# Patient Record
Sex: FEMALE | Race: WHITE | NOT HISPANIC OR LATINO | Employment: FULL TIME | URBAN - METROPOLITAN AREA
[De-identification: names, ages, dates, MRNs, and addresses within clinical notes are randomized per-mention and may not be internally consistent; named-entity substitution may affect disease eponyms.]

---

## 2023-10-22 ENCOUNTER — HOSPITAL ENCOUNTER (OUTPATIENT)
Dept: RADIOLOGY | Facility: HOSPITAL | Age: 43
Discharge: HOME | End: 2023-10-22

## 2023-10-22 DIAGNOSIS — Z82.49 FAMILY HISTORY OF ISCHEMIC HEART DISEASE AND OTHER DISEASES OF THE CIRCULATORY SYSTEM: ICD-10-CM

## 2023-10-22 PROCEDURE — 75571 CT HRT W/O DYE W/CA TEST: CPT

## 2023-11-16 ENCOUNTER — OFFICE VISIT (OUTPATIENT)
Dept: PRIMARY CARE | Facility: CLINIC | Age: 43
End: 2023-11-16
Payer: COMMERCIAL

## 2023-11-16 VITALS
HEIGHT: 62 IN | TEMPERATURE: 98.5 F | WEIGHT: 163 LBS | HEART RATE: 65 BPM | SYSTOLIC BLOOD PRESSURE: 106 MMHG | DIASTOLIC BLOOD PRESSURE: 64 MMHG | RESPIRATION RATE: 14 BRPM | BODY MASS INDEX: 30 KG/M2

## 2023-11-16 DIAGNOSIS — R91.1 LUNG NODULE: Primary | ICD-10-CM

## 2023-11-16 DIAGNOSIS — C44.91 PIGMENTED BASAL CELL CARCINOMA: ICD-10-CM

## 2023-11-16 PROCEDURE — 1036F TOBACCO NON-USER: CPT | Performed by: NURSE PRACTITIONER

## 2023-11-16 PROCEDURE — 99212 OFFICE O/P EST SF 10 MIN: CPT | Performed by: NURSE PRACTITIONER

## 2023-11-16 PROCEDURE — 99212 OFFICE O/P EST SF 10 MIN: CPT | Mod: ZK | Performed by: NURSE PRACTITIONER

## 2023-11-16 SDOH — ECONOMIC STABILITY: FOOD INSECURITY: WITHIN THE PAST 12 MONTHS, YOU WORRIED THAT YOUR FOOD WOULD RUN OUT BEFORE YOU GOT MONEY TO BUY MORE.: NEVER TRUE

## 2023-11-16 SDOH — ECONOMIC STABILITY: FOOD INSECURITY: WITHIN THE PAST 12 MONTHS, THE FOOD YOU BOUGHT JUST DIDN'T LAST AND YOU DIDN'T HAVE MONEY TO GET MORE.: NEVER TRUE

## 2023-11-16 ASSESSMENT — ENCOUNTER SYMPTOMS
LOSS OF SENSATION IN FEET: 0
DEPRESSION: 0
OCCASIONAL FEELINGS OF UNSTEADINESS: 0

## 2023-11-16 ASSESSMENT — COLUMBIA-SUICIDE SEVERITY RATING SCALE - C-SSRS
1. IN THE PAST MONTH, HAVE YOU WISHED YOU WERE DEAD OR WISHED YOU COULD GO TO SLEEP AND NOT WAKE UP?: NO
2. HAVE YOU ACTUALLY HAD ANY THOUGHTS OF KILLING YOURSELF?: NO

## 2023-11-16 ASSESSMENT — PATIENT HEALTH QUESTIONNAIRE - PHQ9
SUM OF ALL RESPONSES TO PHQ9 QUESTIONS 1 AND 2: 0
1. LITTLE INTEREST OR PLEASURE IN DOING THINGS: NOT AT ALL
2. FEELING DOWN, DEPRESSED OR HOPELESS: NOT AT ALL

## 2023-11-16 NOTE — PATIENT INSTRUCTIONS
New patient - new patient information packet given.   3 mm left lower lobe nodule.    Recommend CT Chest   Patient will be notified of results as they become available.     Hiatal hernia to be followed by PCP,

## 2023-11-16 NOTE — PROGRESS NOTES
"Subjective   Patient ID: Katerina Hicks is a 42 y.o. female who presents for Lung Nodule.  HPI42 y.o. female presents today for lung nodule clinic  Never smoker   Second hand smoke exposure.   No family history of cancer.  Moes procedure - Dermatology following q 6 months.   Pigmented basal  cell carcinoma per patient.   No records for review.  [facility]     Ct Cardiac dated 10/22/2023  3 mm nodule observed in the left lower lobe     Review of Systems  Review of systems: Present-feeling well. Not present-chills, fatigue and fever.  Respiratory: Not present-difficulty breathing, cough, bloody sputum.  Cardiovascular: Not present-chest pain, palpitations, dyspnea on exertion.  Objective   /64   Pulse 65   Temp 36.9 °C (98.5 °F)   Resp 14   Ht 1.575 m (5' 2\")   Wt 73.9 kg (163 lb)   BMI 29.81 kg/m²      Physical Exam  Gen.: Mental status-alert. Gen. appearance-cooperative, well groomed and consistent with stated age. Not in acute distress or sickly. Orientation-oriented to time, place, purpose and person. Build and nutrition-well-nourished and well-developed. Hydration-well-hydrated.    Chest and lung exam: Auscultation-normal breath sounds, no adventitious lung sounds and normal vocal resonance. Chest wall is normal in shape and non-tender.    Cardiovascular: Auscultation: Regular rate and rhythm. Heart sounds-normal heart sounds, S1-S2. No murmurs or gallops appreciated. Carotid arteries normal and without bruit.  Assessment/Plan   Diagnoses and all orders for this visit:  Lung nodule  -     CT chest wo IV contrast; Future        "

## 2023-12-01 ENCOUNTER — ANCILLARY PROCEDURE (OUTPATIENT)
Dept: RADIOLOGY | Facility: CLINIC | Age: 43
End: 2023-12-01
Payer: COMMERCIAL

## 2023-12-01 DIAGNOSIS — R91.1 LUNG NODULE: ICD-10-CM

## 2023-12-01 DIAGNOSIS — C44.91 PIGMENTED BASAL CELL CARCINOMA: ICD-10-CM

## 2023-12-01 PROBLEM — J02.0 STREP PHARYNGITIS: Status: ACTIVE | Noted: 2023-12-01

## 2023-12-01 PROBLEM — H66.90 OTITIS MEDIA: Status: ACTIVE | Noted: 2023-12-01

## 2023-12-01 PROBLEM — J01.90 ACUTE SINUSITIS: Status: ACTIVE | Noted: 2023-12-01

## 2023-12-01 PROBLEM — S13.4XXA WHIPLASH: Status: ACTIVE | Noted: 2023-12-01

## 2023-12-01 PROCEDURE — 71250 CT THORAX DX C-: CPT

## 2023-12-01 PROCEDURE — 71250 CT THORAX DX C-: CPT | Performed by: RADIOLOGY

## 2023-12-07 ENCOUNTER — OFFICE VISIT (OUTPATIENT)
Dept: PRIMARY CARE | Facility: CLINIC | Age: 43
End: 2023-12-07
Payer: COMMERCIAL

## 2023-12-07 VITALS
BODY MASS INDEX: 30.29 KG/M2 | SYSTOLIC BLOOD PRESSURE: 106 MMHG | RESPIRATION RATE: 16 BRPM | WEIGHT: 164.6 LBS | DIASTOLIC BLOOD PRESSURE: 67 MMHG | OXYGEN SATURATION: 98 % | TEMPERATURE: 98.5 F | HEART RATE: 61 BPM | HEIGHT: 62 IN

## 2023-12-07 DIAGNOSIS — R91.8 MULTIPLE LUNG NODULES ON CT: Primary | ICD-10-CM

## 2023-12-07 DIAGNOSIS — Z85.828 HISTORY OF BASAL CELL CARCINOMA: ICD-10-CM

## 2023-12-07 DIAGNOSIS — E04.2 MULTINODULAR THYROID: ICD-10-CM

## 2023-12-07 DIAGNOSIS — R59.1 LYMPHADENOPATHY: ICD-10-CM

## 2023-12-07 PROCEDURE — 99213 OFFICE O/P EST LOW 20 MIN: CPT | Performed by: NURSE PRACTITIONER

## 2023-12-07 PROCEDURE — 1036F TOBACCO NON-USER: CPT | Performed by: NURSE PRACTITIONER

## 2023-12-07 PROCEDURE — 99212 OFFICE O/P EST SF 10 MIN: CPT | Mod: ZK | Performed by: NURSE PRACTITIONER

## 2023-12-07 ASSESSMENT — PATIENT HEALTH QUESTIONNAIRE - PHQ9
1. LITTLE INTEREST OR PLEASURE IN DOING THINGS: NOT AT ALL
SUM OF ALL RESPONSES TO PHQ9 QUESTIONS 1 AND 2: 0
2. FEELING DOWN, DEPRESSED OR HOPELESS: NOT AT ALL

## 2023-12-07 ASSESSMENT — PAIN SCALES - GENERAL: PAINLEVEL: 0-NO PAIN

## 2023-12-07 ASSESSMENT — COLUMBIA-SUICIDE SEVERITY RATING SCALE - C-SSRS
1. IN THE PAST MONTH, HAVE YOU WISHED YOU WERE DEAD OR WISHED YOU COULD GO TO SLEEP AND NOT WAKE UP?: NO
6. HAVE YOU EVER DONE ANYTHING, STARTED TO DO ANYTHING, OR PREPARED TO DO ANYTHING TO END YOUR LIFE?: NO
2. HAVE YOU ACTUALLY HAD ANY THOUGHTS OF KILLING YOURSELF?: NO

## 2023-12-07 ASSESSMENT — ENCOUNTER SYMPTOMS
LOSS OF SENSATION IN FEET: 0
DEPRESSION: 0
OCCASIONAL FEELINGS OF UNSTEADINESS: 0

## 2023-12-07 NOTE — PROGRESS NOTES
"Subjective   Patient ID: Katerina Hicks is a 43 y.o. female who presents for Follow-up (Katerina Hicks is here for a follow up visit with a CT scan prior./).  HPI 43 y.o. female presents today for lung nodule clinic  Never smoker   Second hand smoke exposure.   No family history of cancer.  Moes procedure - Dermatology following q 6 months.  Pigmented basal  cell carcinoma per patient.   No records for review.  [facility]     CT Chest 12/1/2023  8 mm pleural-based right lung base nodule image 236 not included  previously. Unchanged 3 mm anterior left lower lobe nodule image 156.  Mild atelectasis anteromedially on the right.    Heterogeneous probably multinodular thyroid gland with  hypoattenuating nodules up to least 1.5 cm on the right and with  associated coarse calcification.      Mild soft tissue prominence anterior mediastinum probably relating to  residual thymic tissue. Mildly prominent nonspecific mediastinal  nodes up to 7 mm short axis image 58 and axillary nodes up to 9 mm  short axis.    Ct Cardiac dated 10/22/2023  3 mm nodule observed in the left lower lobe     Review of Systems  Review of systems: Present-feeling well. Not present-chills, fatigue and fever.  Respiratory: Not present-difficulty breathing, cough, bloody sputum.  Cardiovascular: Not present-chest pain, palpitations, dyspnea on exertion.  Objective   /67   Pulse 61   Temp 36.9 °C (98.5 °F)   Resp 16   Ht 1.575 m (5' 2\")   Wt 74.7 kg (164 lb 9.6 oz)   SpO2 98%   BMI 30.11 kg/m²      Physical Exam  Gen.: Mental status-alert. Gen. appearance-cooperative, well groomed and consistent with stated age. Not in acute distress or sickly. Orientation-oriented to time, place, purpose and person. Build and nutrition-well-nourished and well-developed. Hydration-well-hydrated.    Integumentary: General characteristics-overall examination the patient´s skin reveals-no suspicious lesions, no bruises and no evidence of scars. Color-normal " coloration skin. Skin moisture-normal skin moisture.    Chest and lung exam: Auscultation-normal breath sounds, no adventitious lung sounds and normal vocal resonance. Chest wall is normal in shape and non-tender.    Cardiovascular: Auscultation: Regular rate and rhythm. Heart sounds-normal heart sounds, S1-S2. No murmurs or gallops appreciated. Carotid arteries normal and without bruit.  Assessment/Plan   Diagnoses and all orders for this visit:  Multiple lung nodules on CT  -     Basic metabolic panel; Future  -     NM PET CT lung CA initial diagnosis; Future  Multinodular thyroid  -     NM PET CT lung CA initial diagnosis; Future  History of basal cell carcinoma  -     NM PET CT lung CA initial diagnosis; Future  Lymphadenopathy  -     NM PET CT lung CA initial diagnosis; Future

## 2023-12-07 NOTE — PATIENT INSTRUCTIONS
Lung nodule follow-up.  Unchanged 3 mm anterior left lower lobe nodule. However, there is an 8 mm pleural-based right lung base nodule not included previously.  Considering history of basal cell carcinoma, PET-CT ordered.  She will be notified of results as they become available.    Mild soft tissue prominence anterior mediastinum probably relating to residual thymic tissue.Mildly prominent nonspecific mediastinal and axillary nodes.    Incidental Finding:  There are few small hypoattenuating nodules measuring equal to or greater than 1.5 cm in the thyroid gland. Further evaluation with nonemergent thyroid ultrasound.  PCP to follow.

## 2023-12-08 ENCOUNTER — LAB (OUTPATIENT)
Dept: LAB | Facility: LAB | Age: 43
End: 2023-12-08
Payer: COMMERCIAL

## 2023-12-08 DIAGNOSIS — R91.8 MULTIPLE LUNG NODULES ON CT: ICD-10-CM

## 2023-12-08 LAB
ANION GAP SERPL CALC-SCNC: 10 MMOL/L (ref 10–20)
BUN SERPL-MCNC: 14 MG/DL (ref 6–23)
CALCIUM SERPL-MCNC: 8.7 MG/DL (ref 8.6–10.3)
CHLORIDE SERPL-SCNC: 109 MMOL/L (ref 98–107)
CO2 SERPL-SCNC: 26 MMOL/L (ref 21–32)
CREAT SERPL-MCNC: 0.86 MG/DL (ref 0.5–1.05)
GFR SERPL CREATININE-BSD FRML MDRD: 87 ML/MIN/1.73M*2
GLUCOSE SERPL-MCNC: 95 MG/DL (ref 74–99)
POTASSIUM SERPL-SCNC: 4 MMOL/L (ref 3.5–5.3)
SODIUM SERPL-SCNC: 141 MMOL/L (ref 136–145)

## 2023-12-08 PROCEDURE — 36415 COLL VENOUS BLD VENIPUNCTURE: CPT

## 2023-12-08 PROCEDURE — 80048 BASIC METABOLIC PNL TOTAL CA: CPT

## 2023-12-19 ENCOUNTER — TELEPHONE (OUTPATIENT)
Dept: PRIMARY CARE | Facility: CLINIC | Age: 43
End: 2023-12-19
Payer: COMMERCIAL

## 2023-12-19 NOTE — TELEPHONE ENCOUNTER
Pre cert department - Jacquelyn Lawson  [facility] , Patient CT scan was denied by insurance . She would like to  know how provider would like to proceed regarding  this denial.  Jacquelyn can be reached at [phone]

## 2023-12-20 ENCOUNTER — TELEPHONE (OUTPATIENT)
Dept: PRIMARY CARE | Facility: CLINIC | Age: 43
End: 2023-12-20
Payer: COMMERCIAL

## 2023-12-20 NOTE — TELEPHONE ENCOUNTER
PET Scan was denied by insurance per  Jacquelyn Islas from pre-cert. How do you wish to proceed ? Please contact Jacquelyn Statt at [phone].

## 2023-12-28 ENCOUNTER — APPOINTMENT (OUTPATIENT)
Dept: RADIOLOGY | Facility: CLINIC | Age: 43
End: 2023-12-28
Payer: COMMERCIAL

## 2023-12-28 ENCOUNTER — HOSPITAL ENCOUNTER (OUTPATIENT)
Dept: RADIOLOGY | Facility: HOSPITAL | Age: 43
Discharge: HOME | End: 2023-12-28
Payer: COMMERCIAL

## 2023-12-28 ENCOUNTER — TELEPHONE (OUTPATIENT)
Dept: PRIMARY CARE | Facility: CLINIC | Age: 43
End: 2023-12-28
Payer: COMMERCIAL

## 2023-12-28 DIAGNOSIS — R93.89 ABNORMAL FINDINGS ON DIAGNOSTIC IMAGING OF OTHER SPECIFIED BODY STRUCTURES: ICD-10-CM

## 2023-12-28 DIAGNOSIS — E04.2 NONTOXIC MULTINODULAR GOITER: ICD-10-CM

## 2023-12-28 PROCEDURE — 76536 US EXAM OF HEAD AND NECK: CPT

## 2023-12-28 PROCEDURE — 76536 US EXAM OF HEAD AND NECK: CPT | Performed by: RADIOLOGY

## 2023-12-28 NOTE — TELEPHONE ENCOUNTER
Lena,   I spoke with the patient this morning.   Peer to peer was not completed on 12/27/2023 per request.      Ms Hicks reports the PET was re-scheduled for tomorrow, and you can still call an EMERGENT by calling [phone] 8am EST peer to peer this morning.      Please also call the patient to update her, so that she is aware of status.

## 2023-12-29 ENCOUNTER — HOSPITAL ENCOUNTER (OUTPATIENT)
Dept: RADIOLOGY | Facility: HOSPITAL | Age: 43
Discharge: HOME | End: 2023-12-29
Payer: COMMERCIAL

## 2023-12-29 ENCOUNTER — APPOINTMENT (OUTPATIENT)
Dept: RADIOLOGY | Facility: CLINIC | Age: 43
End: 2023-12-29
Payer: COMMERCIAL

## 2023-12-29 DIAGNOSIS — R91.8 MULTIPLE LUNG NODULES ON CT: ICD-10-CM

## 2023-12-29 DIAGNOSIS — R59.1 LYMPHADENOPATHY: ICD-10-CM

## 2023-12-29 DIAGNOSIS — Z85.828 HISTORY OF BASAL CELL CARCINOMA: ICD-10-CM

## 2023-12-29 DIAGNOSIS — E04.2 MULTINODULAR THYROID: ICD-10-CM

## 2023-12-29 LAB — GLUCOSE BLD MANUAL STRIP-MCNC: 107 MG/DL (ref 74–99)

## 2023-12-29 PROCEDURE — 78815 PET IMAGE W/CT SKULL-THIGH: CPT | Mod: PET TUMOR INIT TX STRAT | Performed by: STUDENT IN AN ORGANIZED HEALTH CARE EDUCATION/TRAINING PROGRAM

## 2023-12-29 PROCEDURE — 78815 PET IMAGE W/CT SKULL-THIGH: CPT | Mod: PI

## 2023-12-29 PROCEDURE — 82947 ASSAY GLUCOSE BLOOD QUANT: CPT

## 2023-12-29 PROCEDURE — A9552 F18 FDG: HCPCS | Performed by: NURSE PRACTITIONER

## 2023-12-29 PROCEDURE — 3430000001 HC RX 343 DIAGNOSTIC RADIOPHARMACEUTICALS: Performed by: NURSE PRACTITIONER

## 2023-12-29 RX ORDER — FLUDEOXYGLUCOSE F 18 200 MCI/ML
12.7 INJECTION, SOLUTION INTRAVENOUS
Status: COMPLETED | OUTPATIENT
Start: 2023-12-29 | End: 2023-12-29

## 2023-12-29 RX ORDER — FLUDEOXYGLUCOSE F 18 200 MCI/ML
12.7 INJECTION, SOLUTION INTRAVENOUS
Status: CANCELLED | OUTPATIENT
Start: 2023-12-29

## 2023-12-29 RX ADMIN — FLUDEOXYGLUCOSE F 18 12.7 MILLICURIE: 200 INJECTION, SOLUTION INTRAVENOUS at 09:07

## 2024-01-02 ENCOUNTER — TELEMEDICINE (OUTPATIENT)
Dept: PRIMARY CARE | Facility: CLINIC | Age: 44
End: 2024-01-02
Payer: COMMERCIAL

## 2024-01-02 DIAGNOSIS — R91.8 MULTIPLE LUNG NODULES ON CT: Primary | ICD-10-CM

## 2024-01-02 PROCEDURE — 99212 OFFICE O/P EST SF 10 MIN: CPT | Performed by: NURSE PRACTITIONER

## 2024-01-02 SDOH — ECONOMIC STABILITY: FOOD INSECURITY: WITHIN THE PAST 12 MONTHS, YOU WORRIED THAT YOUR FOOD WOULD RUN OUT BEFORE YOU GOT MONEY TO BUY MORE.: NEVER TRUE

## 2024-01-02 SDOH — ECONOMIC STABILITY: FOOD INSECURITY: WITHIN THE PAST 12 MONTHS, THE FOOD YOU BOUGHT JUST DIDN'T LAST AND YOU DIDN'T HAVE MONEY TO GET MORE.: NEVER TRUE

## 2024-01-02 ASSESSMENT — ENCOUNTER SYMPTOMS
LOSS OF SENSATION IN FEET: 0
DEPRESSION: 0
OCCASIONAL FEELINGS OF UNSTEADINESS: 0

## 2024-01-02 ASSESSMENT — COLUMBIA-SUICIDE SEVERITY RATING SCALE - C-SSRS
2. HAVE YOU ACTUALLY HAD ANY THOUGHTS OF KILLING YOURSELF?: NO
6. HAVE YOU EVER DONE ANYTHING, STARTED TO DO ANYTHING, OR PREPARED TO DO ANYTHING TO END YOUR LIFE?: NO
1. IN THE PAST MONTH, HAVE YOU WISHED YOU WERE DEAD OR WISHED YOU COULD GO TO SLEEP AND NOT WAKE UP?: NO

## 2024-01-02 NOTE — PROGRESS NOTES
Subjective   Patient ID: Katerina Hicks is a 43 y.o. female who presents for Follow-up ([facility]- discuss Pet scan results./Never smoker./Family Hx of lung cancer ( paternal father side)).  HPI 43 y.o. female presents today for lung nodule clinic  Never smoker   Second hand smoke exposure.   No family history of cancer.  Moes procedure - Dermatology following q 6 months.  Pigmented basal  cell carcinoma per patient.   No records for review.  [facility]     PET scan 12/29/2023  8 mm pleural-based nodule in the right lung base is non FDG avid,  likely nonspecific, follow-up with dedicated CT of chest is  recommended. 3 mm nodule in the left lower lobe is not visualized on  current study.  FDG avidity in the left adnexa likely representing corpus luteum  cyst. Otherwise, no other concerning FDG avid disease identified.    CT Chest 12/1/2023  8 mm pleural-based right lung base nodule image 236 not included  previously. Unchanged 3 mm anterior left lower lobe nodule image 156.  Mild atelectasis anteromedially on the right.     Heterogeneous probably multinodular thyroid gland with  hypoattenuating nodules up to least 1.5 cm on the right and with  associated coarse calcification.      Mild soft tissue prominence anterior mediastinum probably relating to  residual thymic tissue. Mildly prominent nonspecific mediastinal  nodes up to 7 mm short axis image 58 and axillary nodes up to 9 mm  short axis.     Ct Cardiac dated 10/22/2023  3 mm nodule observed in the left lower lobe     Review of Systems  Review of systems: Present-feeling well. Not present-chills, fatigue and fever.  Respiratory: Not present-difficulty breathing, cough, bloody sputum.  Cardiovascular: Not present-chest pain, palpitations, dyspnea on exertion.  Objective   There were no vitals taken for this visit.   Assessment/Plan   Diagnoses and all orders for this visit:  Multiple lung nodules on CT

## 2024-01-02 NOTE — PATIENT INSTRUCTIONS
8 mm lung nodule in the right lung base is non FDG avid.    Recommend CT Chest in 3-6 months.     3 mm nodule in the left lower lobe is not visualized on current study.  FDG avidity in the left adnexa likely representing corpus luteum cyst. Otherwise, no other concerning FDG avid disease identified.  Katerina can continue to follow with gynecology.

## 2024-01-11 DIAGNOSIS — E04.1 THYROID NODULE: Primary | ICD-10-CM

## 2024-01-11 PROBLEM — L29.3 PENILE PRURITUS: Status: RESOLVED | Noted: 2023-12-01 | Resolved: 2024-01-11

## 2024-01-11 PROBLEM — J02.0 STREP PHARYNGITIS: Status: RESOLVED | Noted: 2023-12-01 | Resolved: 2024-01-11

## 2024-01-11 PROBLEM — S13.4XXA WHIPLASH: Status: RESOLVED | Noted: 2023-12-01 | Resolved: 2024-01-11

## 2024-01-11 PROBLEM — J01.90 ACUTE SINUSITIS: Status: RESOLVED | Noted: 2023-12-01 | Resolved: 2024-01-11

## 2024-01-11 PROBLEM — H66.90 OTITIS MEDIA: Status: RESOLVED | Noted: 2023-12-01 | Resolved: 2024-01-11

## 2024-01-31 ENCOUNTER — HOSPITAL ENCOUNTER (OUTPATIENT)
Dept: RADIOLOGY | Facility: HOSPITAL | Age: 44
Discharge: HOME | End: 2024-01-31
Payer: COMMERCIAL

## 2024-01-31 VITALS
RESPIRATION RATE: 18 BRPM | HEART RATE: 60 BPM | OXYGEN SATURATION: 99 % | DIASTOLIC BLOOD PRESSURE: 62 MMHG | SYSTOLIC BLOOD PRESSURE: 113 MMHG

## 2024-01-31 DIAGNOSIS — E04.1 THYROID NODULE: ICD-10-CM

## 2024-01-31 PROCEDURE — 10005 FNA BX W/US GDN 1ST LES: CPT | Performed by: RADIOLOGY

## 2024-01-31 PROCEDURE — 88112 CYTOPATH CELL ENHANCE TECH: CPT | Mod: TC | Performed by: INTERNAL MEDICINE

## 2024-01-31 PROCEDURE — 88112 CYTOPATH CELL ENHANCE TECH: CPT | Performed by: PATHOLOGY

## 2024-01-31 PROCEDURE — 76942 ECHO GUIDE FOR BIOPSY: CPT

## 2024-01-31 ASSESSMENT — PAIN SCALES - GENERAL: PAINLEVEL_OUTOF10: 0 - NO PAIN

## 2024-01-31 ASSESSMENT — PAIN - FUNCTIONAL ASSESSMENT: PAIN_FUNCTIONAL_ASSESSMENT: 0-10

## 2024-01-31 NOTE — PROCEDURES
Interventional Radiology Brief Postprocedure Note    Attending: Parul Harrell MD    Assistant:   Staff Role   Chanelle Chairez, ARVIND Radiology Nurse   Parul Harrell MD Radiologist       Diagnosis:   1. Thyroid nodule  US guided fine percutaneous aspiration    US guided fine percutaneous aspiration          Description of procedure: US guided fine percutaneous aspiration 25 G FNA x 4    Timeout:  Yes    Procedure Area: Procedure Area     Anesthesia:   local 1 % lidocaine    Complications: None    Estimated Blood Loss: none    Medications (Filter: Administrations occurring from 0817 to 0854 on 01/31/24)      None                See detailed result report with images in PACS.    The patient tolerated the procedure well without incident or complication and is in stable condition.

## 2024-01-31 NOTE — PRE-PROCEDURE NOTE
Interventional Radiology Preprocedure Note    Indication for procedure: The encounter diagnosis was Thyroid nodule.    Relevant review of systems: NA    Relevant Labs:   Lab Results   Component Value Date    CREATININE 0.86 12/08/2023    EGFR 87 12/08/2023       Planned Sedation/Anesthesia: Moderate    Airway assessment: normal    Directed physical examination:    Aox3  No increased work of breathing.   No acute distress      Mallampati: II (hard and soft palate, upper portion of tonsils anduvula visible)    ASA Score: ASA 3 - Patient with moderate systemic disease with functional limitations    Benefits, risks and alternatives of procedure and planned sedation have been discussed with the patient and/or their representative. All questions answered and they agree to proceed.

## 2024-02-01 LAB
LABORATORY COMMENT REPORT: NORMAL
LABORATORY COMMENT REPORT: NORMAL
PATH REPORT.FINAL DX SPEC: NORMAL
PATH REPORT.GROSS SPEC: NORMAL
PATH REPORT.RELEVANT HX SPEC: NORMAL
PATH REPORT.TOTAL CANCER: NORMAL

## 2024-02-26 LAB — TEST COMMENT - SURGICAL SENDOUT REQUEST: NORMAL

## 2024-04-16 ENCOUNTER — APPOINTMENT (OUTPATIENT)
Dept: RADIOLOGY | Facility: CLINIC | Age: 44
End: 2024-04-16
Payer: COMMERCIAL

## 2024-04-17 ENCOUNTER — HOSPITAL ENCOUNTER (OUTPATIENT)
Dept: RADIOLOGY | Facility: CLINIC | Age: 44
Discharge: HOME | End: 2024-04-17
Payer: COMMERCIAL

## 2024-04-17 DIAGNOSIS — R91.8 MULTIPLE LUNG NODULES ON CT: ICD-10-CM

## 2024-04-17 PROCEDURE — 71250 CT THORAX DX C-: CPT | Performed by: RADIOLOGY

## 2024-04-17 PROCEDURE — 71250 CT THORAX DX C-: CPT

## 2024-04-18 ENCOUNTER — TELEPHONE (OUTPATIENT)
Dept: PRIMARY CARE | Facility: CLINIC | Age: 44
End: 2024-04-18
Payer: COMMERCIAL

## 2024-04-18 NOTE — TELEPHONE ENCOUNTER
Ms. Hicks completed her CT 4/17/2024, she stated that she will be following up with Dr Muhammad next week.  She stated that her insurance issues. Please result CT through [employer] and she will follow up with PULM if indicated.

## 2024-04-24 ENCOUNTER — TELEPHONE (OUTPATIENT)
Dept: PRIMARY CARE | Facility: CLINIC | Age: 44
End: 2024-04-24
Payer: COMMERCIAL

## 2024-04-24 NOTE — TELEPHONE ENCOUNTER
Spoke with Katerina sparks CT chest results.    Pleural-based nodule of the right peripheral lung field measuring 8 mm in size similar in size and morphology to the prior examination. 3 mm nodule left lung base stable. No new nodules. No acute infiltrate or pleural effusion.      She does have an appointment today with Dr. Muhammad - pulmonology -- she will be following up with him due to insurance coverage.

## 2024-04-30 LAB
AP SUMMARY REPORT: NORMAL
SCAN RESULT: NORMAL

## 2024-05-08 ENCOUNTER — OFFICE VISIT (OUTPATIENT)
Dept: SURGERY | Facility: CLINIC | Age: 44
End: 2024-05-08
Payer: COMMERCIAL

## 2024-05-08 VITALS
WEIGHT: 167 LBS | BODY MASS INDEX: 30.73 KG/M2 | SYSTOLIC BLOOD PRESSURE: 118 MMHG | DIASTOLIC BLOOD PRESSURE: 77 MMHG | HEART RATE: 70 BPM | OXYGEN SATURATION: 99 % | TEMPERATURE: 98.4 F | HEIGHT: 62 IN

## 2024-05-08 DIAGNOSIS — E04.1 THYROID NODULE: Primary | ICD-10-CM

## 2024-05-08 DIAGNOSIS — Z00.00 HEALTHCARE MAINTENANCE: ICD-10-CM

## 2024-05-08 DIAGNOSIS — Z01.818 PREOPERATIVE TESTING: ICD-10-CM

## 2024-05-08 PROCEDURE — 76536 US EXAM OF HEAD AND NECK: CPT | Performed by: SURGERY

## 2024-05-08 PROCEDURE — 99205 OFFICE O/P NEW HI 60 MIN: CPT | Performed by: SURGERY

## 2024-05-08 PROCEDURE — 1036F TOBACCO NON-USER: CPT | Performed by: SURGERY

## 2024-05-08 ASSESSMENT — ENCOUNTER SYMPTOMS
MUSCULOSKELETAL NEGATIVE: 1
CONSTITUTIONAL NEGATIVE: 1
NEUROLOGICAL NEGATIVE: 1
RESPIRATORY NEGATIVE: 1
HEMATOLOGIC/LYMPHATIC NEGATIVE: 1
EYES NEGATIVE: 1
PSYCHIATRIC NEGATIVE: 1
ENDOCRINE NEGATIVE: 1
GASTROINTESTINAL NEGATIVE: 1
CARDIOVASCULAR NEGATIVE: 1

## 2024-05-08 ASSESSMENT — PAIN SCALES - GENERAL: PAINLEVEL: 0-NO PAIN

## 2024-05-08 NOTE — PROGRESS NOTES
Subjective   Patient ID: Katerina Hicks is a 43 y.o. female who presents for a newly diagnosed papillary thyroid cancer.    HPI I saw Mrs. Hicks in surgery clinic today.  She was referred by Dr. Devin Bolden of medical endocrinology.      In October 2023 she underwent a cardiac scoring CT scan.  There was an incidental finding of a pulmonary nodule.  Based on this she then got a formal chest CT done in December 2023.  That showed a nodule in the right thyroid lobe, a pleural-based right lung nodule and a 3 mm nodule in the left lobe.  Due to the pulmonary nodule she then underwent a PET scan.  There was no uptake in the thyroid gland.  There was no cervical lymphadenopathy.  The 8 mm right-sided lung nodule had no uptake on PET.  The left side was likely too small.  Overall no worrisome lesions seen on her PET scan.    However, as she did have the right sided thyroid nodule, in December 2023 she underwent a dedicated thyroid ultrasound which showed a 1.1 cm nodule in the right thyroid lobe.  This was TI-RADS 4 and had some peripheral calcifications associated with it.  Based on this she was sent for biopsy which came back follicular lesion of undetermined significance as a Gilbert class III lesion.  The sample was then sent out for additional molecular profiling which came back positive for a BRAF V 600 mutation as well as an NRAS mutation.  The BRAF mutation would make this consistent with papillary thyroid cancer.  Based on the ultrasound she has no nodular disease in the left thyroid lobe.  Therefore she would be a reasonable candidate to undergo a thyroid lobectomy.    She denies any neck pain no difficulties breathing or swallowing no troubles with her voice other than some intermittent raspiness which is more likely associated with her issues of sinus drainage and acid reflux.    She denies any personal history of head neck or chest radiation exposure.    Family history she has 3 family members with history of  thyroid cancer.  The closest relative is a maternal aunt.  The other 2 are likely second cousins.  Her maternal aunt did have papillary thyroid cancer.      Review of Systems   Constitutional: Negative.    HENT: Negative.     Eyes: Negative.    Respiratory: Negative.     Cardiovascular: Negative.    Gastrointestinal: Negative.    Endocrine: Negative.    Musculoskeletal: Negative.    Neurological: Negative.    Hematological: Negative.    Psychiatric/Behavioral: Negative.         Objective   Physical Exam  Vitals reviewed.   Constitutional:       Appearance: Normal appearance.   Eyes:      Comments: No proptosis   Neck:      Vascular: No carotid bruit.      Comments: 1 cm palpable nodule right thyroid lobe.  This moves easily upon swallowing with no localized fixation.  Trachea midline.  No palpable nodules in the left thyroid.  Cardiovascular:      Rate and Rhythm: Normal rate and regular rhythm.      Pulses: Normal pulses.      Heart sounds: Normal heart sounds.   Pulmonary:      Effort: Pulmonary effort is normal. No respiratory distress.      Breath sounds: Normal breath sounds. No wheezing or rales.   Musculoskeletal:         General: Normal range of motion.   Lymphadenopathy:      Cervical: No cervical adenopathy.   Skin:     General: Skin is warm and dry.   Neurological:      General: No focal deficit present.      Mental Status: She is alert and oriented to person, place, and time.   Psychiatric:         Mood and Affect: Mood normal.         Behavior: Behavior normal.         THYGENEXT/THYRAMIR  Order: [id number] - Reflex for Order [id number]   Status: Final result       Visible to patient: Yes (seen)    0 Result Notes      Component 3 mo ago   Scan Result See Scanned Result   Summary Report A complete ThyGeNEXT result issued by [employer], [address] [place] [address], TEL: [phone], FAX: [phone], is on file in the Department of Anatomic Pathology at [facility] and can be viewed in patient's chart.    RESULTS SUMMARY  Nodule: Right Mid Thyroid Slide    Cytopathology: AUS/FLUS(B-III)    ThyGeNext: BRAF V600E, NRAS Q61R    ThyraMIR: Not Required ( 95% Risk of Malignancy )    *Risk assessment is based on disease prevalence of associated cytology diagnosis, mutational changes, microRNA expression, clinical experience, and clinical validation (1,2)    1. Michele RAMÍREZ, et al. Diagn Cytopathol. 2020; 48 (12): 3326-1599.  2. Danny WALSH et al. Diagn Cytopathol. 2019; 47 (4): 268-274.    TEST RESULT INTERPRETATION  Right Mid Thyroid Slide  High risk of malignancy          Patient ID: Katerina Hicks is a 43 y.o. female.    General    Date/Time: 5/8/2024 10:52 AM    Performed by: Chele Clancy MD  Authorized by: Chele Clancy MD    Consent:     Consent obtained:  Verbal    Consent given by:  Patient    Risks, benefits, and alternatives were discussed: yes      Alternatives discussed:  No treatment and observation  Universal protocol:     Procedure explained and questions answered to patient or proxy's satisfaction: yes      Relevant documents present and verified: yes      Test results available: yes      Imaging studies available: yes      Required blood products, implants, devices, and special equipment available: yes    Procedure specific details:      Neck ultrasound    In the office I did a neck ultrasound in a patient with a 6-15 MHz linear ultrasound probe.  Patient has a known history of right thyroid nodule initially seen on ultrasound December 28, 2023.  Biopsy of this done in April with genetic testing was consistent with papillary thyroid cancer.  We will be scheduling her for upcoming surgery.  I need to verify that there is no new disease in the left thyroid lobe and that she has no cervical lymphadenopathy that would impact her staging her surgical procedure.    The right thyroid lobe still contains a solitary 1.18 x 0.8 x 0.9 cm hypoechoic nodule with a  single macrocalcification.  This is unchanged from her prior ultrasound.  There are no new nodules in the right thyroid lobe or left thyroid lobe.  I then examined the central neck lymph node compartment which was negative for any abnormal lymph nodes.  Per ALEXANDRA guidelines I also examined her lateral neck compartments.  Her bilateral submandibular glands are smooth uniform and normal.  I then examined levels 2 through 4 in the right and left necks.  Lymph nodes ranging from 3 mm to 5 mm in size.  All of these are normal nodes.  Oval-shaped normal fatty juan no microcalcifications no hypervascularity.  No abnormal lymph nodes were noted.    Images were captured and reviewed with the patient.  Post-procedure details:     Procedure completion:  Tolerated      Assessment/Plan    Mrs. Hicks had incidental discovery of nodular thyroid disease during a chest CT for follow-up of some pulmonary nodules.  Ultrasound revealed a 1.1 cm right sided TI-RADS 4 nodule.  Biopsy of this came back Neffs class III, follicular lesion of undetermined significance.  Her endocrinologist then sent this for molecular profiling which did show a BRAF V600 mutation which would be consistent with papillary thyroid cancer.    She denies any neck pain or compressive symptoms.  She is clinically and biochemically euthyroid.    There is family history of thyroid cancer.    Today in the office on physical exam I can palpate the right thyroid nodule but no other worrisome findings in the thyroid and no cervical lymphadenopathy.  Per ALEXANDRA guidelines and then did a formal neck ultrasound.  The nodule in the right thyroid lobe has not changed in size in the last 4 months.  There are no nodules seen on the left side.  No cervical lymphadenopathy.  Full report is listed above.    Plan    1.  I told her and her  that I think she would be an excellent candidate for a right thyroid lobectomy.  We discussed the general risks and benefits of surgery.  We  discussed the possible need for thyroid hormone replacement after surgery.  She can be monitored by her endocrinologist.    2.  I will have my office staff contact her for surgical date but we are likely looking at sometime in June.    3.  I had my nurse review our standard preop packet with her.  We will also make sure to update lab work and urine pregnancy test prior to surgery.         Chele Clancy MD 05/08/24 10:07 AM

## 2024-05-30 ENCOUNTER — TELEPHONE (OUTPATIENT)
Dept: SURGERY | Facility: CLINIC | Age: 44
End: 2024-05-30
Payer: COMMERCIAL

## 2024-05-30 NOTE — TELEPHONE ENCOUNTER
Patient called to report 1 day h/o diagnosed right arm cellulitis from possible bug bite and poison ivy. Patient seen by PCP and started on 10 day course of antibiotics and steroids. Patient wants to make sure this does not interfere with surgery scheduled 6/21/24. Patient notified that I will update Dr. Clancy on patient condition and follow up with her next week. She is agreeable with plan.

## 2024-06-05 ENCOUNTER — PREP FOR PROCEDURE (OUTPATIENT)
Dept: SURGICAL ONCOLOGY | Facility: HOSPITAL | Age: 44
End: 2024-06-05
Payer: COMMERCIAL

## 2024-06-05 ENCOUNTER — TELEPHONE (OUTPATIENT)
Dept: SURGERY | Facility: CLINIC | Age: 44
End: 2024-06-05
Payer: COMMERCIAL

## 2024-06-05 DIAGNOSIS — C73 THYROID CANCER (MULTI): Primary | ICD-10-CM

## 2024-06-05 NOTE — H&P
History Of Present Illness  Katerina Hicks is a 43 y.o. female presenting with a newly diagnosed papillary thyroid cancer.     HPI I saw Mrs. Hicks in surgery clinic today.  She was referred by Dr. Devin Bolden of medical endocrinology.       In October 2023 she underwent a cardiac scoring CT scan.  There was an incidental finding of a pulmonary nodule.  Based on this she then got a formal chest CT done in December 2023.  That showed a nodule in the right thyroid lobe, a pleural-based right lung nodule and a 3 mm nodule in the left lobe.  Due to the pulmonary nodule she then underwent a PET scan.  There was no uptake in the thyroid gland.  There was no cervical lymphadenopathy.  The 8 mm right-sided lung nodule had no uptake on PET.  The left side was likely too small.  Overall no worrisome lesions seen on her PET scan.     However, as she did have the right sided thyroid nodule, in December 2023 she underwent a dedicated thyroid ultrasound which showed a 1.1 cm nodule in the right thyroid lobe.  This was TI-RADS 4 and had some peripheral calcifications associated with it.  Based on this she was sent for biopsy which came back follicular lesion of undetermined significance as a [place] class III lesion.  The sample was then sent out for additional molecular profiling which came back positive for a BRAF V 600 mutation as well as an NRAS mutation.  The BRAF mutation would make this consistent with papillary thyroid cancer.  Based on the ultrasound she has no nodular disease in the left thyroid lobe.  Therefore she would be a reasonable candidate to undergo a thyroid lobectomy.     She denies any neck pain no difficulties breathing or swallowing no troubles with her voice other than some intermittent raspiness which is more likely associated with her issues of sinus drainage and acid reflux.     She denies any personal history of head neck or chest radiation exposure.     Family history she has 3 family members with history  of thyroid cancer.  The closest relative is a maternal aunt.  The other 2 are likely second cousins.  Her maternal aunt did have papillary thyroid cancer.        Review of Systems   Constitutional: Negative.    HENT: Negative.     Eyes: Negative.    Respiratory: Negative.     Cardiovascular: Negative.    Gastrointestinal: Negative.    Endocrine: Negative.    Musculoskeletal: Negative.    Neurological: Negative.    Hematological: Negative.    Psychiatric/Behavioral: Negative.      .     Past Medical History  Past Medical History:   Diagnosis Date    Lung nodule        Surgical History  No past surgical history on file.     Social History  She reports that she has never smoked. She has never used smokeless tobacco. She reports current alcohol use of about 2.0 standard drinks of alcohol per week. She reports that she does not use drugs.    Family History  Family History   Problem Relation Name Age of Onset    Heart disease Mother      Clotting disorder Mother      Arthritis Mother      Heart attack Mother      Rheum arthritis Mother      Asthma Father      Parkinsonism Father          Allergies  Patient has no known allergies.    Review of Systems     Physical ExamConstitutional:       Appearance: Normal appearance.   Eyes:      Comments: No proptosis   Neck:      Vascular: No carotid bruit.      Comments: 1 cm palpable nodule right thyroid lobe.  This moves easily upon swallowing with no localized fixation.  Trachea midline.  No palpable nodules in the left thyroid.  Cardiovascular:      Rate and Rhythm: Normal rate and regular rhythm.      Pulses: Normal pulses.      Heart sounds: Normal heart sounds.   Pulmonary:      Effort: Pulmonary effort is normal. No respiratory distress.      Breath sounds: Normal breath sounds. No wheezing or rales.   Musculoskeletal:         General: Normal range of motion.   Lymphadenopathy:      Cervical: No cervical adenopathy.   Skin:     General: Skin is warm and dry.   Neurological:       General: No focal deficit present.      Mental Status: She is alert and oriented to person, place, and time.   Psychiatric:         Mood and Affect: Mood normal.         Last Recorded Vitals  There were no vitals taken for this visit.    Relevant Results        THYGENEXT/THYRAMIR  Order: [id number] - Reflex for Order [id number]   Status: Final result       Visible to patient: Yes (seen)    0 Result Notes        Component 3 mo ago   Scan Result See Scanned Result   Summary Report A complete ThyGeNEXT result issued by [employer], [address] [place], [place] [address], TEL: [phone], FAX: [phone], is on file in the Department of Anatomic Pathology at [facility] and can be viewed in patient's chart.    RESULTS SUMMARY  Nodule: Right Mid Thyroid Slide    Cytopathology: AUS/FLUS(B-III)    ThyGeNext: BRAF V600E, NRAS Q61R    ThyraMIR: Not Required ( 95% Risk of Malignancy )         US THYROID;  12/28/2023 6:49 pm      INDICATION:  Signs/Symptoms:nontoxic multinodular goiter , abnormal findings.      COMPARISON:  None.      ACCESSION NUMBER(S):  [id number]      ORDERING CLINICIAN:  HEIDI DIA      TECHNIQUE:  Multiple ultrasonographic images of the thyroid gland were obtained.      FINDINGS:          RIGHT LOBE:  4.1 x 1.3 x 1.4 cm. Midzone to lower pole peripherally calcified  isoechoic solid nodule (TR 4) measuring 1.1 x 0.8 x 1 cm.      LEFT LOBE:  4.3 x 0.9 x 1.5 cm.      ISTHMUS:  0.2 cm.      IMPRESSION:  TR 4 nodule in the right lobe.     Assessment/Plan       Mrs Hicks has a small T2 nodule in the right thyroid lobe which is positive on biopsy and genetic testing for papillary thyroid cancer.  There are no nodules on the left side.  Therefore [facility]  Risk benefits of surgery were discussed with the patient she agreed and would like to  proceed forward.    My office will contact her the day before surgery.  My nurse will send her one of our preop packet send make sure she has updated lab work and urine pregnancy test prior to surgery.             Chele Clancy MD

## 2024-06-05 NOTE — TELEPHONE ENCOUNTER
Follow up call to patient. Cellulitis improving and she has about 3 days left of treatment. Notified patient that condition reviewed with Dr. Clancy and as long as she continues to improve she should be alright to proceed with surgery 6/21/24. Instructed patient to notify us if she doesn't continue to improve as surgery approaches. She is in agreement with plan. Patient plans to has pre-op labs drawn next week. Confirms that she still has pre-op instruction packet given to her in clinic and has no further questions at this time.

## 2024-06-06 ENCOUNTER — PREP FOR PROCEDURE (OUTPATIENT)
Dept: SURGICAL ONCOLOGY | Facility: HOSPITAL | Age: 44
End: 2024-06-06
Payer: COMMERCIAL

## 2024-06-06 DIAGNOSIS — C73 THYROID CANCER (MULTI): Primary | ICD-10-CM

## 2024-06-12 ENCOUNTER — LAB (OUTPATIENT)
Dept: LAB | Facility: LAB | Age: 44
End: 2024-06-12
Payer: COMMERCIAL

## 2024-06-12 DIAGNOSIS — E04.1 THYROID NODULE: ICD-10-CM

## 2024-06-12 DIAGNOSIS — Z01.818 PREOPERATIVE TESTING: ICD-10-CM

## 2024-06-12 DIAGNOSIS — Z00.00 HEALTHCARE MAINTENANCE: ICD-10-CM

## 2024-06-12 PROBLEM — C73 THYROID CANCER (MULTI): Status: ACTIVE | Noted: 2024-06-12

## 2024-06-12 LAB
ALBUMIN SERPL BCP-MCNC: 4 G/DL (ref 3.4–5)
ALP SERPL-CCNC: 44 U/L (ref 33–110)
ALT SERPL W P-5'-P-CCNC: 13 U/L (ref 7–45)
ANION GAP SERPL CALC-SCNC: 9 MMOL/L (ref 10–20)
APTT PPP: 32 SECONDS (ref 27–38)
AST SERPL W P-5'-P-CCNC: 10 U/L (ref 9–39)
BILIRUB SERPL-MCNC: 0.5 MG/DL (ref 0–1.2)
BUN SERPL-MCNC: 11 MG/DL (ref 6–23)
CALCIUM SERPL-MCNC: 8.8 MG/DL (ref 8.6–10.3)
CHLORIDE SERPL-SCNC: 108 MMOL/L (ref 98–107)
CO2 SERPL-SCNC: 28 MMOL/L (ref 21–32)
CREAT SERPL-MCNC: 0.87 MG/DL (ref 0.5–1.05)
EGFRCR SERPLBLD CKD-EPI 2021: 85 ML/MIN/1.73M*2
ERYTHROCYTE [DISTWIDTH] IN BLOOD BY AUTOMATED COUNT: 12.4 % (ref 11.5–14.5)
GLUCOSE SERPL-MCNC: 105 MG/DL (ref 74–99)
HCT VFR BLD AUTO: 43.1 % (ref 36–46)
HGB BLD-MCNC: 14.4 G/DL (ref 12–16)
INR PPP: 1 (ref 0.9–1.1)
MCH RBC QN AUTO: 32 PG (ref 26–34)
MCHC RBC AUTO-ENTMCNC: 33.4 G/DL (ref 32–36)
MCV RBC AUTO: 96 FL (ref 80–100)
NRBC BLD-RTO: 0 /100 WBCS (ref 0–0)
PLATELET # BLD AUTO: 267 X10*3/UL (ref 150–450)
POTASSIUM SERPL-SCNC: 4.4 MMOL/L (ref 3.5–5.3)
PROT SERPL-MCNC: 6.2 G/DL (ref 6.4–8.2)
PROTHROMBIN TIME: 11.4 SECONDS (ref 9.8–12.8)
RBC # BLD AUTO: 4.5 X10*6/UL (ref 4–5.2)
SODIUM SERPL-SCNC: 141 MMOL/L (ref 136–145)
WBC # BLD AUTO: 6.7 X10*3/UL (ref 4.4–11.3)

## 2024-06-12 PROCEDURE — 85027 COMPLETE CBC AUTOMATED: CPT

## 2024-06-12 PROCEDURE — 85730 THROMBOPLASTIN TIME PARTIAL: CPT

## 2024-06-12 PROCEDURE — 36415 COLL VENOUS BLD VENIPUNCTURE: CPT

## 2024-06-12 PROCEDURE — 80053 COMPREHEN METABOLIC PANEL: CPT

## 2024-06-12 PROCEDURE — 85610 PROTHROMBIN TIME: CPT

## 2024-06-12 NOTE — H&P
History Of Present Illness  Katerina Hicks is a 43 y.o. female presenting with  a newly diagnosed papillary thyroid cancer.  She was referred by Dr. Devin Bolden of medical endocrinology.       In October 2023 she underwent a cardiac scoring CT scan.  There was an incidental finding of a pulmonary nodule.  Based on this she then got a formal chest CT done in December 2023.  That showed a nodule in the right thyroid lobe, a pleural-based right lung nodule and a 3 mm nodule in the left lobe.  Due to the pulmonary nodule she then underwent a PET scan.  There was no uptake in the thyroid gland.  There was no cervical lymphadenopathy.  The 8 mm right-sided lung nodule had no uptake on PET.  The left side was likely too small.  Overall no worrisome lesions seen on her PET scan.     However, as she did have the right sided thyroid nodule, in December 2023 she underwent a dedicated thyroid ultrasound which showed a 1.1 cm nodule in the right thyroid lobe.  This was TI-RADS 4 and had some peripheral calcifications associated with it.  Based on this she was sent for biopsy which came back follicular lesion of undetermined significance as a [place] class III lesion.  The sample was then sent out for additional molecular profiling which came back positive for a BRAF V 600 mutation as well as an NRAS mutation.  The BRAF mutation would make this consistent with papillary thyroid cancer.  Based on the ultrasound she has no nodular disease in the left thyroid lobe.  Therefore she would be a reasonable candidate to undergo a thyroid lobectomy.     She denies any neck pain no difficulties breathing or swallowing no troubles with her voice other than some intermittent raspiness which is more likely associated with her issues of sinus drainage and acid reflux.     She denies any personal history of head neck or chest radiation exposure.     Family history she has 3 family members with history of thyroid cancer.  The closest relative is a  maternal aunt.  The other 2 are likely second cousins.  Her maternal aunt did have papillary thyroid cancer..     Past Medical History  Past Medical History:   Diagnosis Date    Lung nodule        Surgical History  No past surgical history on file.     Social History  She reports that she has never smoked. She has never used smokeless tobacco. She reports current alcohol use of about 2.0 standard drinks of alcohol per week. She reports that she does not use drugs.    Family History  Family History   Problem Relation Name Age of Onset    Heart disease Mother      Clotting disorder Mother      Arthritis Mother      Heart attack Mother      Rheum arthritis Mother      Asthma Father      Parkinsonism Father          Allergies  Patient has no known allergies.    Review of Systems     Physical ExamConstitutional:       Appearance: Normal appearance.   Eyes:      Comments: No proptosis   Neck:      Vascular: No carotid bruit.      Comments: 1 cm palpable nodule right thyroid lobe.  This moves easily upon swallowing with no localized fixation.  Trachea midline.  No palpable nodules in the left thyroid.  Cardiovascular:      Rate and Rhythm: Normal rate and regular rhythm.      Pulses: Normal pulses.      Heart sounds: Normal heart sounds.   Pulmonary:      Effort: Pulmonary effort is normal. No respiratory distress.      Breath sounds: Normal breath sounds. No wheezing or rales.   Musculoskeletal:         General: Normal range of motion.   Lymphadenopathy:      Cervical: No cervical adenopathy.   Skin:     General: Skin is warm and dry.   Neurological:      General: No focal deficit present.      Mental Status: She is alert and oriented to person, place, and time.   Psychiatric:         Mood and Affect: Mood normal.         Behavior: Behavior normal.         Last Recorded Vitals  There were no vitals taken for this visit.    Relevant Results             Assessment/Plan       I am booking her for a right thyroid lobectomy at  [facility] on Friday, 6/21/2024.  My office staff will call her the day before surgery with what time to arrive.  I will have my nurse be sure she has updated lab work and urine pregnancy test prior to surgery.         Chele Clancy MD

## 2024-06-12 NOTE — H&P (VIEW-ONLY)
History Of Present Illness  Katerina Hicks is a 43 y.o. female presenting with  a newly diagnosed papillary thyroid cancer.  She was referred by Dr. Devin Bolden of medical endocrinology.       In October 2023 she underwent a cardiac scoring CT scan.  There was an incidental finding of a pulmonary nodule.  Based on this she then got a formal chest CT done in December 2023.  That showed a nodule in the right thyroid lobe, a pleural-based right lung nodule and a 3 mm nodule in the left lobe.  Due to the pulmonary nodule she then underwent a PET scan.  There was no uptake in the thyroid gland.  There was no cervical lymphadenopathy.  The 8 mm right-sided lung nodule had no uptake on PET.  The left side was likely too small.  Overall no worrisome lesions seen on her PET scan.     However, as she did have the right sided thyroid nodule, in December 2023 she underwent a dedicated thyroid ultrasound which showed a 1.1 cm nodule in the right thyroid lobe.  This was TI-RADS 4 and had some peripheral calcifications associated with it.  Based on this she was sent for biopsy which came back follicular lesion of undetermined significance as a [place] class III lesion.  The sample was then sent out for additional molecular profiling which came back positive for a BRAF V 600 mutation as well as an NRAS mutation.  The BRAF mutation would make this consistent with papillary thyroid cancer.  Based on the ultrasound she has no nodular disease in the left thyroid lobe.  Therefore she would be a reasonable candidate to undergo a thyroid lobectomy.     She denies any neck pain no difficulties breathing or swallowing no troubles with her voice other than some intermittent raspiness which is more likely associated with her issues of sinus drainage and acid reflux.     She denies any personal history of head neck or chest radiation exposure.     Family history she has 3 family members with history of thyroid cancer.  The closest relative is a  maternal aunt.  The other 2 are likely second cousins.  Her maternal aunt did have papillary thyroid cancer..     Past Medical History  Past Medical History:   Diagnosis Date    Lung nodule        Surgical History  No past surgical history on file.     Social History  She reports that she has never smoked. She has never used smokeless tobacco. She reports current alcohol use of about 2.0 standard drinks of alcohol per week. She reports that she does not use drugs.    Family History  Family History   Problem Relation Name Age of Onset    Heart disease Mother      Clotting disorder Mother      Arthritis Mother      Heart attack Mother      Rheum arthritis Mother      Asthma Father      Parkinsonism Father          Allergies  Patient has no known allergies.    Review of Systems     Physical ExamConstitutional:       Appearance: Normal appearance.   Eyes:      Comments: No proptosis   Neck:      Vascular: No carotid bruit.      Comments: 1 cm palpable nodule right thyroid lobe.  This moves easily upon swallowing with no localized fixation.  Trachea midline.  No palpable nodules in the left thyroid.  Cardiovascular:      Rate and Rhythm: Normal rate and regular rhythm.      Pulses: Normal pulses.      Heart sounds: Normal heart sounds.   Pulmonary:      Effort: Pulmonary effort is normal. No respiratory distress.      Breath sounds: Normal breath sounds. No wheezing or rales.   Musculoskeletal:         General: Normal range of motion.   Lymphadenopathy:      Cervical: No cervical adenopathy.   Skin:     General: Skin is warm and dry.   Neurological:      General: No focal deficit present.      Mental Status: She is alert and oriented to person, place, and time.   Psychiatric:         Mood and Affect: Mood normal.         Behavior: Behavior normal.         Last Recorded Vitals  There were no vitals taken for this visit.    Relevant Results             Assessment/Plan       I am booking her for a right thyroid lobectomy at  [facility] on Friday, 6/21/2024.  My office staff will call her the day before surgery with what time to arrive.  I will have my nurse be sure she has updated lab work and urine pregnancy test prior to surgery.         Chele Clancy MD

## 2024-06-20 ENCOUNTER — ANESTHESIA EVENT (OUTPATIENT)
Dept: OPERATING ROOM | Facility: HOSPITAL | Age: 44
End: 2024-06-20
Payer: COMMERCIAL

## 2024-06-21 ENCOUNTER — HOSPITAL ENCOUNTER (OUTPATIENT)
Facility: HOSPITAL | Age: 44
Setting detail: OUTPATIENT SURGERY
Discharge: HOME | End: 2024-06-21
Attending: SURGERY | Admitting: SURGERY
Payer: COMMERCIAL

## 2024-06-21 ENCOUNTER — ANESTHESIA (OUTPATIENT)
Dept: OPERATING ROOM | Facility: HOSPITAL | Age: 44
End: 2024-06-21
Payer: COMMERCIAL

## 2024-06-21 VITALS
WEIGHT: 162.26 LBS | OXYGEN SATURATION: 92 % | DIASTOLIC BLOOD PRESSURE: 58 MMHG | TEMPERATURE: 97.5 F | HEIGHT: 62 IN | SYSTOLIC BLOOD PRESSURE: 121 MMHG | BODY MASS INDEX: 29.86 KG/M2 | RESPIRATION RATE: 16 BRPM | HEART RATE: 68 BPM

## 2024-06-21 DIAGNOSIS — C73 THYROID CANCER (MULTI): Primary | ICD-10-CM

## 2024-06-21 LAB
ABO GROUP (TYPE) IN BLOOD: NORMAL
ANTIBODY SCREEN: NORMAL
PREGNANCY TEST URINE, POC: NEGATIVE
RH FACTOR (ANTIGEN D): NORMAL

## 2024-06-21 PROCEDURE — 88307 TISSUE EXAM BY PATHOLOGIST: CPT | Performed by: PATHOLOGY

## 2024-06-21 PROCEDURE — 2500000001 HC RX 250 WO HCPCS SELF ADMINISTERED DRUGS (ALT 637 FOR MEDICARE OP)

## 2024-06-21 PROCEDURE — 7100000001 HC RECOVERY ROOM TIME - INITIAL BASE CHARGE: Performed by: SURGERY

## 2024-06-21 PROCEDURE — 36415 COLL VENOUS BLD VENIPUNCTURE: CPT | Performed by: STUDENT IN AN ORGANIZED HEALTH CARE EDUCATION/TRAINING PROGRAM

## 2024-06-21 PROCEDURE — 81025 URINE PREGNANCY TEST: CPT | Performed by: STUDENT IN AN ORGANIZED HEALTH CARE EDUCATION/TRAINING PROGRAM

## 2024-06-21 PROCEDURE — 7100000002 HC RECOVERY ROOM TIME - EACH INCREMENTAL 1 MINUTE: Performed by: SURGERY

## 2024-06-21 PROCEDURE — 3600000004 HC OR TIME - INITIAL BASE CHARGE - PROCEDURE LEVEL FOUR: Performed by: SURGERY

## 2024-06-21 PROCEDURE — A4217 STERILE WATER/SALINE, 500 ML: HCPCS | Performed by: SURGERY

## 2024-06-21 PROCEDURE — 3600000009 HC OR TIME - EACH INCREMENTAL 1 MINUTE - PROCEDURE LEVEL FOUR: Performed by: SURGERY

## 2024-06-21 PROCEDURE — 60220 PARTIAL REMOVAL OF THYROID: CPT | Performed by: SURGERY

## 2024-06-21 PROCEDURE — 88307 TISSUE EXAM BY PATHOLOGIST: CPT | Mod: TC,SUR,WESLAB | Performed by: SURGERY

## 2024-06-21 PROCEDURE — 7100000009 HC PHASE TWO TIME - INITIAL BASE CHARGE: Performed by: SURGERY

## 2024-06-21 PROCEDURE — 7100000010 HC PHASE TWO TIME - EACH INCREMENTAL 1 MINUTE: Performed by: SURGERY

## 2024-06-21 PROCEDURE — 2500000004 HC RX 250 GENERAL PHARMACY W/ HCPCS (ALT 636 FOR OP/ED)

## 2024-06-21 PROCEDURE — 2500000005 HC RX 250 GENERAL PHARMACY W/O HCPCS

## 2024-06-21 PROCEDURE — 86901 BLOOD TYPING SEROLOGIC RH(D): CPT | Performed by: STUDENT IN AN ORGANIZED HEALTH CARE EDUCATION/TRAINING PROGRAM

## 2024-06-21 PROCEDURE — 2720000007 HC OR 272 NO HCPCS: Performed by: SURGERY

## 2024-06-21 PROCEDURE — 2500000004 HC RX 250 GENERAL PHARMACY W/ HCPCS (ALT 636 FOR OP/ED): Performed by: SURGERY

## 2024-06-21 PROCEDURE — 3700000001 HC GENERAL ANESTHESIA TIME - INITIAL BASE CHARGE: Performed by: SURGERY

## 2024-06-21 PROCEDURE — 3700000002 HC GENERAL ANESTHESIA TIME - EACH INCREMENTAL 1 MINUTE: Performed by: SURGERY

## 2024-06-21 RX ORDER — OXYCODONE AND ACETAMINOPHEN 5; 325 MG/1; MG/1
1 TABLET ORAL EVERY 6 HOURS PRN
Qty: 12 TABLET | Refills: 0 | Status: SHIPPED | OUTPATIENT
Start: 2024-06-21 | End: 2024-06-24

## 2024-06-21 RX ORDER — MIDAZOLAM HYDROCHLORIDE 1 MG/ML
INJECTION INTRAMUSCULAR; INTRAVENOUS AS NEEDED
Status: DISCONTINUED | OUTPATIENT
Start: 2024-06-21 | End: 2024-06-21

## 2024-06-21 RX ORDER — OXYCODONE HYDROCHLORIDE 5 MG/1
10 TABLET ORAL EVERY 4 HOURS PRN
Status: DISCONTINUED | OUTPATIENT
Start: 2024-06-21 | End: 2024-06-21 | Stop reason: HOSPADM

## 2024-06-21 RX ORDER — ACETAMINOPHEN 10 MG/ML
INJECTION, SOLUTION INTRAVENOUS AS NEEDED
Status: DISCONTINUED | OUTPATIENT
Start: 2024-06-21 | End: 2024-06-21

## 2024-06-21 RX ORDER — HYDROMORPHONE HYDROCHLORIDE 1 MG/ML
0.2 INJECTION, SOLUTION INTRAMUSCULAR; INTRAVENOUS; SUBCUTANEOUS EVERY 5 MIN PRN
Status: DISCONTINUED | OUTPATIENT
Start: 2024-06-21 | End: 2024-06-21 | Stop reason: HOSPADM

## 2024-06-21 RX ORDER — SODIUM CHLORIDE 0.9 G/100ML
IRRIGANT IRRIGATION AS NEEDED
Status: DISCONTINUED | OUTPATIENT
Start: 2024-06-21 | End: 2024-06-21 | Stop reason: HOSPADM

## 2024-06-21 RX ORDER — HYDROMORPHONE HYDROCHLORIDE 1 MG/ML
0.5 INJECTION, SOLUTION INTRAMUSCULAR; INTRAVENOUS; SUBCUTANEOUS EVERY 5 MIN PRN
Status: DISCONTINUED | OUTPATIENT
Start: 2024-06-21 | End: 2024-06-21 | Stop reason: HOSPADM

## 2024-06-21 RX ORDER — ROCURONIUM BROMIDE 10 MG/ML
INJECTION, SOLUTION INTRAVENOUS AS NEEDED
Status: DISCONTINUED | OUTPATIENT
Start: 2024-06-21 | End: 2024-06-21

## 2024-06-21 RX ORDER — PROPOFOL 10 MG/ML
INJECTION, EMULSION INTRAVENOUS AS NEEDED
Status: DISCONTINUED | OUTPATIENT
Start: 2024-06-21 | End: 2024-06-21

## 2024-06-21 RX ORDER — DROPERIDOL 2.5 MG/ML
0.62 INJECTION, SOLUTION INTRAMUSCULAR; INTRAVENOUS ONCE AS NEEDED
Status: DISCONTINUED | OUTPATIENT
Start: 2024-06-21 | End: 2024-06-21 | Stop reason: HOSPADM

## 2024-06-21 RX ORDER — BUPIVACAINE HYDROCHLORIDE 5 MG/ML
INJECTION, SOLUTION PERINEURAL AS NEEDED
Status: DISCONTINUED | OUTPATIENT
Start: 2024-06-21 | End: 2024-06-21 | Stop reason: HOSPADM

## 2024-06-21 RX ORDER — HYDROMORPHONE HYDROCHLORIDE 1 MG/ML
INJECTION, SOLUTION INTRAMUSCULAR; INTRAVENOUS; SUBCUTANEOUS AS NEEDED
Status: DISCONTINUED | OUTPATIENT
Start: 2024-06-21 | End: 2024-06-21

## 2024-06-21 RX ORDER — PHENYLEPHRINE HCL IN 0.9% NACL 0.4MG/10ML
SYRINGE (ML) INTRAVENOUS AS NEEDED
Status: DISCONTINUED | OUTPATIENT
Start: 2024-06-21 | End: 2024-06-21

## 2024-06-21 RX ORDER — LIDOCAINE HYDROCHLORIDE 40 MG/ML
SOLUTION TOPICAL AS NEEDED
Status: DISCONTINUED | OUTPATIENT
Start: 2024-06-21 | End: 2024-06-21

## 2024-06-21 RX ORDER — FENTANYL CITRATE 50 UG/ML
INJECTION, SOLUTION INTRAMUSCULAR; INTRAVENOUS AS NEEDED
Status: DISCONTINUED | OUTPATIENT
Start: 2024-06-21 | End: 2024-06-21

## 2024-06-21 RX ORDER — SODIUM CHLORIDE, SODIUM LACTATE, POTASSIUM CHLORIDE, CALCIUM CHLORIDE 600; 310; 30; 20 MG/100ML; MG/100ML; MG/100ML; MG/100ML
INJECTION, SOLUTION INTRAVENOUS CONTINUOUS PRN
Status: DISCONTINUED | OUTPATIENT
Start: 2024-06-21 | End: 2024-06-21

## 2024-06-21 RX ORDER — SODIUM CHLORIDE, SODIUM LACTATE, POTASSIUM CHLORIDE, CALCIUM CHLORIDE 600; 310; 30; 20 MG/100ML; MG/100ML; MG/100ML; MG/100ML
100 INJECTION, SOLUTION INTRAVENOUS CONTINUOUS
Status: DISCONTINUED | OUTPATIENT
Start: 2024-06-21 | End: 2024-06-21 | Stop reason: HOSPADM

## 2024-06-21 RX ORDER — LIDOCAINE HYDROCHLORIDE 10 MG/ML
0.1 INJECTION INFILTRATION; PERINEURAL ONCE
Status: DISCONTINUED | OUTPATIENT
Start: 2024-06-21 | End: 2024-06-21 | Stop reason: HOSPADM

## 2024-06-21 RX ORDER — OXYCODONE HYDROCHLORIDE 5 MG/1
5 TABLET ORAL EVERY 4 HOURS PRN
Status: DISCONTINUED | OUTPATIENT
Start: 2024-06-21 | End: 2024-06-21 | Stop reason: HOSPADM

## 2024-06-21 RX ORDER — ONDANSETRON HYDROCHLORIDE 2 MG/ML
INJECTION, SOLUTION INTRAVENOUS AS NEEDED
Status: DISCONTINUED | OUTPATIENT
Start: 2024-06-21 | End: 2024-06-21

## 2024-06-21 RX ORDER — LIDOCAINE HYDROCHLORIDE 20 MG/ML
INJECTION, SOLUTION INFILTRATION; PERINEURAL AS NEEDED
Status: DISCONTINUED | OUTPATIENT
Start: 2024-06-21 | End: 2024-06-21

## 2024-06-21 RX ORDER — ONDANSETRON HYDROCHLORIDE 2 MG/ML
4 INJECTION, SOLUTION INTRAVENOUS ONCE AS NEEDED
Status: COMPLETED | OUTPATIENT
Start: 2024-06-21 | End: 2024-06-21

## 2024-06-21 SDOH — HEALTH STABILITY: MENTAL HEALTH: CURRENT SMOKER: 0

## 2024-06-21 ASSESSMENT — PAIN SCALES - GENERAL
PAINLEVEL_OUTOF10: 0 - NO PAIN
PAINLEVEL_OUTOF10: 2
PAINLEVEL_OUTOF10: 3
PAINLEVEL_OUTOF10: 4
PAINLEVEL_OUTOF10: 0 - NO PAIN
PAINLEVEL_OUTOF10: 0 - NO PAIN
PAIN_LEVEL: 0
PAINLEVEL_OUTOF10: 0 - NO PAIN

## 2024-06-21 ASSESSMENT — PAIN - FUNCTIONAL ASSESSMENT
PAIN_FUNCTIONAL_ASSESSMENT: 0-10

## 2024-06-21 NOTE — OP NOTE
Lobectomy Thyroid (R) Operative Note     Date: 2024  OR Location: [facility] OR    Name: Katerina Hicks YOB: 1980, Age: 43 y.o., MRN: [id number], Sex: female    Diagnosis  Pre-op Diagnosis     * Thyroid cancer (Multi) [C73] Post-op Diagnosis     * Thyroid cancer (Multi) [C73]     Procedures  Lobectomy Thyroid  00239 - NV TOTAL THYROID LOBECTOMY UNI W/WO ISTHMUSECTOMY      Surgeons      * Chele Clancy - Primary    Resident/Fellow/Other Assistant:  Surgeons and Role:     * Zane Thurston MD - Resident - Assisting    Procedure Summary  Anesthesia: General  ASA: II  Anesthesia Staff: Anesthesiologist: Jessica Segundo MD  Anesthesia Resident: Bryce Dickson DO; Linda Bradley MD  Estimated Blood Loss: 5mL  Intra-op Medications: Administrations occurring from 0815 to 1110 on 24:  * No intraprocedure medications in log *           Anesthesia Record               Intraprocedure I/O Totals       None           Specimen:   ID Type Source Tests Collected by Time   1 : RIGHT THYROID LOBE Tissue THYROID LOBECTOMY RIGHT SURGICAL PATHOLOGY EXAM Chele Clancy MD 2024 1041        Staff:   Circulator: Myrtle So Person: Charline  Circulator: Aldo So Person: Omar         Drains and/or Catheters: * None in log *    Tourniquet Times:         Implants:     Findings: 1 cm nodule right thyroid lobe well-contained.  No abnormal cervical lymphadenopathy.    Indications: Katerina Hicks is an 43 y.o. female who is having surgery for Thyroid cancer (Multi) [C73].  Patient was undergoing chest CT for follow-up of some routine pulmonary nodules.  There was incidental nodular thyroid disease noted on the right side which prompted an ultrasound showing a 1.1 cm TI-RADS 4 lesion.  Biopsy of this came back [place] class III.  Her endocrinologist then sent her for molecular profiling of the nodule which came back positive for a BRAF V6 100 mutation consistent with papillary thyroid cancer.  She has no disease in the left  lobe and no abnormal cervical lymphadenopathy on preop ultrasound.  Therefore I told her we were planning a right thyroid lobectomy with possible total if something unusual rebound during the operation such as local extension disease on the opposite side that would be unexpected or lymphadenopathy.  She agreed and signed consent.  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        The patient was seen in the preoperative area. The risks, benefits, complications, treatment options, non-operative alternatives, expected recovery and outcomes were discussed with the patient. The possibilities of reaction to medication, pulmonary aspiration, injury to surrounding structures, bleeding, recurrent infection, the need for additional procedures, failure to diagnose a condition, and creating a complication requiring transfusion or operation were discussed with the patient. The patient concurred with the proposed plan, giving informed consent.  The site of surgery was properly noted/marked if necessary per policy. The patient has been actively warmed in preoperative area. Preoperative antibiotics are not indicated. Venous thrombosis prophylaxis have been ordered including bilateral sequential compression devices    Procedure Details: On the operative date patient's brought to the operating room after induction anesthetic she is prepped and draped in usual sterile fashion with a towel roll behind the shoulders and neck gently extended.  She had SCDs on for DVT prophylaxis.  We made a 5 cm cervical collar incision.  We divided down through platysma with Bovie electrocautery.  We raised subplatysmal flap superior to the notch and the thyroid cartilage inferior to the sternal notch and laterally of the sternocleidomastoid muscles.  We then  the strap muscles in the midline.  We retracted the right sternohyoid and right sternothyroid muscles out laterally to level the carotid  artery.  The nodule was well encapsulated in the thyroid gland with no extension to the overlying strap musculature surrounding soft tissues.    We then divided the middle thyroid vein with 3-0 silk ties.  Next identified the cricothyroid space and went medial to lateral around the superior pole vessels with 2-0 and 3-0 silk ties proximally LigaSure toward the thyroid specimen side.  Once we reached the posterior lateral aspect of the thyroid cartilage no further dissection was done in the upper pole.  We then went down of the trachea and took branches the inferior thyroid vein.  At the 8 o'clock position was a normal-appearing right inferior parathyroid gland peeled away intact and well-vascularized pedicle.  We then dissected out laterally trachea and identified the inferior thyroid artery.  Coursing underneath the artery in standard location out lateral was the right recurrent laryngeal nerve.  We stayed anteromedial to the nerve with all 3-0 silk ties.  LigaSure was not used in this area.  We divided through the inferior thyroid artery and then began rolling the thyroid gland and medially.  Right superior parathyroid gland was not clearly identified.  We followed the nerve up to the cricothyroid musculature and then rolled the thyroid gland up onto the trachea.  We divided behind the isthmus with Bovie electrocautery.  We followed that over the junction of the left thyroid lobe.  There was no pyramidal lobe noted.  No abnormal central neck lymph nodes.  No palpable nodules in the left lobe.    We then went ahead and used the LigaSure device and divided across the isthmus at the junction of the left thyroid lobe which gave good hemostasis and removed the specimen.  This was marked as right thyroid lobe with a double tailed suture on the right superior pole and a single tailed suture on the isthmus.  Specimen was sent off to pathology.  We irrigated the neck out well and achieved hemostasis.  Recurrent nerve was  intact.  Right inferior parathyroid looked well-vascularized.  Overall hemostasis was good.  We did use Andrei powder for final hemostasis.  We then closed straps and platysma with 3-0 Vicryl skin with 4-0 Monocryl half percent Marcaine was used for local anesthetic.  Dry sterile dressings were applied.  Complications:  None; patient tolerated the procedure well.    Disposition: PACU - hemodynamically stable.  Condition: stable         Additional Details:     Attending Attestation: I was present for the entire procedure.    Chele Clancy  Phone Number: [phone]

## 2024-06-21 NOTE — ANESTHESIA PREPROCEDURE EVALUATION
Patient: Katerina Hicks    Procedure Information       Date/Time: 06/21/24 0815    Procedure: Lobectomy Thyroid (Right)    Location: [facility] OR 12 / [facility] OR    Surgeons: Chele Clancy MD            Relevant Problems   Anesthesia (within normal limits)      Cardiac (within normal limits)      Pulmonary (within normal limits)      Neuro (within normal limits)      GI (within normal limits)      /Renal (within normal limits)      Liver (within normal limits)      Endocrine   (+) Thyroid cancer (Multi)      Musculoskeletal (within normal limits)      GYN  Uterine ablation       Clinical information reviewed:   Tobacco  Allergies  Meds   Med Hx  Surg Hx  OB Status  Fam Hx  Soc   Hx        NPO Detail:  NPO/Void Status  Date of Last Liquid: 06/20/24  Time of Last Liquid: 2130  Date of Last Solid: 06/20/24  Time of Last Solid: 2130  Last Intake Type: Clear fluids         Physical Exam    Airway  Mallampati: I  TM distance: >3 FB     Cardiovascular    Dental    Pulmonary    Abdominal        Anesthesia Plan    History of general anesthesia?: yes  History of complications of general anesthesia?: unknown/emergency    ASA 2     general     The patient is not a current smoker.  Patient was not previously instructed to abstain from smoking on day of procedure.  Patient did not smoke on day of procedure.    intravenous induction   Postoperative administration of opioids is intended.  Trial extubation is planned.  Anesthetic plan and risks discussed with patient.  Use of blood products discussed with patient who did not consent to blood products.    Plan discussed with resident.

## 2024-06-21 NOTE — ANESTHESIA POSTPROCEDURE EVALUATION
Patient: Katerina Hicks    Procedure Summary       Date: 06/21/24 Room / Location: [facility] OR 12 / [facility] OR    Anesthesia Start: 0920 Anesthesia Stop: 1134    Procedure: Lobectomy Thyroid (Right: Neck) Diagnosis:       Thyroid cancer (Multi)      (Thyroid cancer (Multi) [C73])    Surgeons: Chele Clancy MD Responsible Provider: Jessica Segundo MD    Anesthesia Type: general ASA Status: 2            Anesthesia Type: general    Vitals Value Taken Time   /71 06/21/24 1131   Temp 36.0 06/21/24 1137   Pulse 74 06/21/24 1134   Resp 11 06/21/24 1134   SpO2 97 % 06/21/24 1134   Vitals shown include unfiled device data.    Anesthesia Post Evaluation    Patient location during evaluation: PACU  Patient participation: complete - patient participated  Level of consciousness: awake  Pain score: 0  Pain management: adequate  Multimodal analgesia pain management approach  Airway patency: patent  Cardiovascular status: acceptable  Respiratory status: acceptable  Hydration status: acceptable  Postoperative Nausea and Vomiting: none        Encounter Notable Events   Notable Event Outcome Phase Comment   Prolonged N/V requiring intervention  Intraprocedure

## 2024-06-21 NOTE — ANESTHESIA PROCEDURE NOTES
Airway  Date/Time: 6/21/2024 9:29 AM  Urgency: elective    Airway not difficult    Staffing  Performed: resident   Authorized by: Jessica Segundo MD    Performed by: Linda Bradley MD  Patient location during procedure: OR    Indications and Patient Condition  Indications for airway management: anesthesia and airway protection  Spontaneous ventilation: present  Sedation level: deep  Preoxygenated: yes  Patient position: sniffing  Mask difficulty assessment: 1 - vent by mask  Planned trial extubation    Final Airway Details  Final airway type: endotracheal airway      Successful airway: ETT  Cuffed: yes   Successful intubation technique: direct laryngoscopy  Facilitating devices/methods: intubating stylet  Endotracheal tube insertion site: oral  Blade: Farrah  Blade size: #3  ETT size (mm): 7.0  Cormack-Lehane Classification: grade I - full view of glottis  Placement verified by: capnometry   Measured from: lips  ETT to lips (cm): 22  Number of attempts at approach: 1

## 2024-07-10 ENCOUNTER — APPOINTMENT (OUTPATIENT)
Dept: SURGERY | Facility: CLINIC | Age: 44
End: 2024-07-10
Payer: COMMERCIAL

## 2024-07-10 VITALS
BODY MASS INDEX: 30 KG/M2 | DIASTOLIC BLOOD PRESSURE: 77 MMHG | HEART RATE: 61 BPM | SYSTOLIC BLOOD PRESSURE: 118 MMHG | HEIGHT: 62 IN | RESPIRATION RATE: 16 BRPM | WEIGHT: 163 LBS

## 2024-07-10 DIAGNOSIS — C73 THYROID CANCER (MULTI): Primary | ICD-10-CM

## 2024-07-10 PROCEDURE — 1036F TOBACCO NON-USER: CPT | Performed by: SURGERY

## 2024-07-10 PROCEDURE — 99024 POSTOP FOLLOW-UP VISIT: CPT | Performed by: SURGERY

## 2024-07-10 ASSESSMENT — PAIN SCALES - GENERAL: PAINLEVEL: 0-NO PAIN

## 2024-07-10 NOTE — PROGRESS NOTES
Subjective   Patient ID: Katerina Hicks is a 43 y.o. female who presents for postop after right thyroid lobectomy June 21, 2024.    HPI I saw Mrs. Hicks back in surgery clinic today.  She is now just about 3 weeks status post right thyroid lobectomy for papillary thyroid cancer.  This was a small 1.1 cm tumor.  There was no abnormal cervical lymphadenopathy noted at the time of her operation and her left lobe was normal.  She did well with the surgery and was discharged home as an outpatient.    Her pathology is still pending today.  I told her we will contact her with final pathology once it is available.    She is doing very well after surgery.  No neck pain no difficulty breathing or swallowing no problems with her voice.    No signs or symptoms of hypothyroidism.  She has not yet scheduled a follow-up visit with her endocrinologist.    Review of Systems    Objective   Physical Exam  Vitals reviewed.   Constitutional:       Appearance: Normal appearance.      Comments: Her voice is normal   Neck:      Comments: Neck incision healing well.  Right thyroid lobe surgically absent.  No seroma.  Neurological:      Mental Status: She is alert.         Assessment/Plan    Mrs. Hicks did very well with right thyroid lobectomy.  She had no postop complications her neck incision is healing well.  She is very happy with her overall surgical outcome.    Her pathology is still pending and I told her we will contact her as soon as her final pathology is back.    I encouraged her to follow-up with her endocrinologist for monitoring of her TSH and free T4.    I would like to see her back again in about 6 months.  She can schedule an appointment with us today.         Chele Clancy MD 07/10/24 12:10 PM

## 2024-07-15 LAB
LAB AP BLOCK FOR ADDITIONAL STUDIES: NORMAL
LABORATORY COMMENT REPORT: NORMAL
PATH REPORT.COMMENTS IMP SPEC: NORMAL
PATH REPORT.FINAL DX SPEC: NORMAL
PATH REPORT.GROSS SPEC: NORMAL
PATH REPORT.RELEVANT HX SPEC: NORMAL
PATH REPORT.TOTAL CANCER: NORMAL
PATHOLOGY SYNOPTIC REPORT: NORMAL

## 2024-07-22 ENCOUNTER — LAB (OUTPATIENT)
Dept: LAB | Facility: LAB | Age: 44
End: 2024-07-22
Payer: COMMERCIAL

## 2024-07-22 DIAGNOSIS — Z85.850 PERSONAL HISTORY OF MALIGNANT NEOPLASM OF THYROID: ICD-10-CM

## 2024-07-22 DIAGNOSIS — E21.1 SECONDARY HYPERPARATHYROIDISM, NOT ELSEWHERE CLASSIFIED (MULTI): Primary | ICD-10-CM

## 2024-07-22 LAB
25(OH)D3 SERPL-MCNC: 22 NG/ML (ref 30–100)
CA-I BLD-SCNC: 1.22 MMOL/L (ref 1.1–1.33)
PTH-INTACT SERPL-MCNC: 95.1 PG/ML (ref 18.5–88)
TSH SERPL-ACNC: 13.63 MIU/L (ref 0.44–3.98)

## 2024-07-22 PROCEDURE — 82306 VITAMIN D 25 HYDROXY: CPT

## 2024-07-22 PROCEDURE — 86800 THYROGLOBULIN ANTIBODY: CPT

## 2024-07-22 PROCEDURE — 83970 ASSAY OF PARATHORMONE: CPT

## 2024-07-22 PROCEDURE — 36415 COLL VENOUS BLD VENIPUNCTURE: CPT

## 2024-07-22 PROCEDURE — 84443 ASSAY THYROID STIM HORMONE: CPT

## 2024-07-22 PROCEDURE — 84432 ASSAY OF THYROGLOBULIN: CPT

## 2024-07-22 PROCEDURE — 82330 ASSAY OF CALCIUM: CPT

## 2024-07-23 LAB
BILL ONLY-THYROGLOBULIN: NORMAL
THYROGLOB AB SERPL-ACNC: <0.9 IU/ML (ref 0–4)
THYROGLOB AB SERPL-ACNC: <0.9 IU/ML (ref 0–4)
THYROGLOB SERPL-MCNC: 17.3 NG/ML (ref 1.3–31.8)
THYROGLOB SERPL-MCNC: NORMAL NG/ML (ref 1.3–31.8)

## 2024-11-15 ENCOUNTER — HOSPITAL ENCOUNTER (OUTPATIENT)
Dept: RADIOLOGY | Facility: HOSPITAL | Age: 44
Discharge: HOME | End: 2024-11-15
Payer: COMMERCIAL

## 2024-11-15 DIAGNOSIS — R91.8 OTHER NONSPECIFIC ABNORMAL FINDING OF LUNG FIELD: ICD-10-CM

## 2024-11-15 PROCEDURE — 71250 CT THORAX DX C-: CPT

## 2024-11-26 ENCOUNTER — LAB (OUTPATIENT)
Dept: LAB | Facility: LAB | Age: 44
End: 2024-11-26
Payer: COMMERCIAL

## 2024-11-26 DIAGNOSIS — E03.9 HYPOTHYROIDISM, UNSPECIFIED: Primary | ICD-10-CM

## 2024-11-26 LAB — TSH SERPL-ACNC: 8.16 MIU/L (ref 0.44–3.98)

## 2024-11-26 PROCEDURE — 36415 COLL VENOUS BLD VENIPUNCTURE: CPT

## 2024-11-26 PROCEDURE — 84443 ASSAY THYROID STIM HORMONE: CPT

## 2024-12-19 ENCOUNTER — APPOINTMENT (OUTPATIENT)
Dept: SURGERY | Facility: CLINIC | Age: 44
End: 2024-12-19
Payer: COMMERCIAL

## 2024-12-19 VITALS
DIASTOLIC BLOOD PRESSURE: 76 MMHG | SYSTOLIC BLOOD PRESSURE: 119 MMHG | WEIGHT: 167 LBS | HEART RATE: 66 BPM | BODY MASS INDEX: 30.54 KG/M2

## 2024-12-19 DIAGNOSIS — C73 THYROID CANCER (MULTI): Primary | ICD-10-CM

## 2024-12-19 PROCEDURE — 99213 OFFICE O/P EST LOW 20 MIN: CPT | Performed by: SURGERY

## 2024-12-19 RX ORDER — ERGOCALCIFEROL 1.25 MG/1
1 CAPSULE ORAL
COMMUNITY
Start: 2024-11-26

## 2024-12-19 NOTE — PROGRESS NOTES
Subjective   Patient ID: Katerina Hicks is a 44 y.o. female who presents for 6-month follow-up visit after her right thyroid lobectomy for papillary thyroid cancer.    HPI I saw Mrs. Hicks back in surgery clinic today.  She is now 6 months status post right thyroid lobectomy for a stage I, T1a, N0 papillary thyroid cancer.  She continues to follow with Dr. Bolden her endocrinologist.  Her TSH after her surgery was high around 11.  Repeat is down to 8.1.  She is still being monitored and evaluated as to a potential need for thyroid hormone replacement.  She did have a thyroglobulin level checked which was 17.3 which is normal considering her residual left thyroid lobe.    She denies any changes in her neck.  No pain.  No pressure.  No difficulty breathing or swallowing.  No troubles with her voice.    She did have a CT scan done of her chest for follow-up of pulmonary nodules in November.  And they made mention that there was some postoperative changes related to her right thyroid lobectomy and questioned if there was any residual tissue on the right side.  I personally reviewed the images.  Left thyroid lobe is intact and appears normal.  I think what the radiologist is commenting on is the area where we transected the right thyroid lobe at the junction of the isthmus.  I see no residual tissue in the paratracheal bed.  No abnormal cervical lymphadenopathy.  I reviewed all this with the patient.    Objective   Physical Exam  Vitals reviewed.   Constitutional:       Appearance: Normal appearance.      Comments: Her voice is normal   Neck:      Comments: Neck incision well-healed.  Right thyroid lobe surgically absent.  No palpable tissue on the right.  Trachea midline.  Left lobe feels normal.  No palpable nodules.  Lymphadenopathy:      Cervical: No cervical adenopathy.   Neurological:      Mental Status: She is alert.         Assessment/Plan    Mrs. Hicks is now 6 months status post right thyroid lobectomy for stage I  papillary thyroid cancer.  She continues to do well.  Biochemically she has been a bit on the hypothyroid side but her numbers have improved slightly over time.  She continues to follow-up with her endocrinologist who is monitoring this.  She has repeat laboratory testing again in January to help determine if she have any long-term need for thyroid hormone replacement.    I did review with her on her chest CT scan done for follow-up of pulmonary nodules the radiologist, mentioned that they noted that the right thyroid lobe was surgically absent, but they question that there was a small residual area of thyroid tissue on the right.  When I review it this is right at the junction of the isthmus where we transected the right thyroid lobe.  There is no significant tissue in the right paratracheal bed.  No abnormal cervical lymphadenopathy.  There is nothing here that would suggest a recurrence.    She is scheduled for a neck ultrasound in January which should give a better picture of that area.  She will do that with her endocrinologist.    If her ultrasound is normal in January, she can see me on an annual basis.         Chele Clancy MD 12/19/24 9:11 AM

## 2024-12-27 ENCOUNTER — HOSPITAL ENCOUNTER (OUTPATIENT)
Dept: RADIOLOGY | Facility: HOSPITAL | Age: 44
Discharge: HOME | End: 2024-12-27
Payer: COMMERCIAL

## 2024-12-27 DIAGNOSIS — R93.89 ABNORMAL FINDINGS ON DIAGNOSTIC IMAGING OF OTHER SPECIFIED BODY STRUCTURES: ICD-10-CM

## 2024-12-27 DIAGNOSIS — E04.2 NONTOXIC MULTINODULAR GOITER: ICD-10-CM

## 2024-12-27 PROCEDURE — 76536 US EXAM OF HEAD AND NECK: CPT

## 2025-01-06 ENCOUNTER — APPOINTMENT (OUTPATIENT)
Dept: RADIOLOGY | Facility: HOSPITAL | Age: 45
End: 2025-01-06
Payer: COMMERCIAL

## 2025-01-08 ENCOUNTER — APPOINTMENT (OUTPATIENT)
Dept: SURGERY | Facility: CLINIC | Age: 45
End: 2025-01-08
Payer: COMMERCIAL

## 2025-01-22 ENCOUNTER — LAB (OUTPATIENT)
Dept: LAB | Facility: LAB | Age: 45
End: 2025-01-22
Payer: COMMERCIAL

## 2025-01-22 DIAGNOSIS — E03.9 HYPOTHYROIDISM, UNSPECIFIED: Primary | ICD-10-CM

## 2025-01-22 DIAGNOSIS — E55.9 VITAMIN D DEFICIENCY, UNSPECIFIED: ICD-10-CM

## 2025-01-22 LAB
25(OH)D3 SERPL-MCNC: 75 NG/ML (ref 30–100)
ALBUMIN SERPL BCP-MCNC: 4 G/DL (ref 3.4–5)
ALP SERPL-CCNC: 57 U/L (ref 33–110)
ALT SERPL W P-5'-P-CCNC: 18 U/L (ref 7–45)
ANION GAP SERPL CALC-SCNC: 10 MMOL/L (ref 10–20)
AST SERPL W P-5'-P-CCNC: 12 U/L (ref 9–39)
BILIRUB SERPL-MCNC: 0.5 MG/DL (ref 0–1.2)
BUN SERPL-MCNC: 12 MG/DL (ref 6–23)
CALCIUM SERPL-MCNC: 9 MG/DL (ref 8.6–10.3)
CHLORIDE SERPL-SCNC: 107 MMOL/L (ref 98–107)
CO2 SERPL-SCNC: 28 MMOL/L (ref 21–32)
CREAT SERPL-MCNC: 0.92 MG/DL (ref 0.5–1.05)
EGFRCR SERPLBLD CKD-EPI 2021: 79 ML/MIN/1.73M*2
GLUCOSE SERPL-MCNC: 98 MG/DL (ref 74–99)
POTASSIUM SERPL-SCNC: 5 MMOL/L (ref 3.5–5.3)
PROT SERPL-MCNC: 6.6 G/DL (ref 6.4–8.2)
SODIUM SERPL-SCNC: 140 MMOL/L (ref 136–145)
TSH SERPL-ACNC: 6.91 MIU/L (ref 0.44–3.98)

## 2025-01-22 PROCEDURE — 84443 ASSAY THYROID STIM HORMONE: CPT

## 2025-01-22 PROCEDURE — 82306 VITAMIN D 25 HYDROXY: CPT

## 2025-01-22 PROCEDURE — 80053 COMPREHEN METABOLIC PANEL: CPT

## 2025-05-12 ENCOUNTER — HOSPITAL ENCOUNTER (OUTPATIENT)
Dept: RADIOLOGY | Facility: HOSPITAL | Age: 45
Discharge: HOME | End: 2025-05-12
Payer: COMMERCIAL

## 2025-05-12 DIAGNOSIS — R91.8 OTHER NONSPECIFIC ABNORMAL FINDING OF LUNG FIELD: ICD-10-CM

## 2025-05-12 PROCEDURE — 71250 CT THORAX DX C-: CPT

## 2025-05-12 PROCEDURE — 71250 CT THORAX DX C-: CPT | Performed by: STUDENT IN AN ORGANIZED HEALTH CARE EDUCATION/TRAINING PROGRAM

## (undated) DEVICE — MANIFOLD, 4 PORT NEPTUNE STANDARD

## (undated) DEVICE — DRESSING, ADHESIVE, ISLAND, TELFA, 2 X 3.75 IN, LF

## (undated) DEVICE — DRAPE, PAD, INSTRUMENT, MAGNETIC, MEDIUM, 10 X 16 IN, DISPOSABLE

## (undated) DEVICE — STRIP, SKIN CLOSURE, STERI STRIP, REINFORCED, 0.5 X 4 IN

## (undated) DEVICE — DRAPE, SHEET, THYROID, W/ARMBOARD COVER, 100 X 121 IN, DISPOSABLE, LF, STERILE

## (undated) DEVICE — Device

## (undated) DEVICE — COUNTER, NEEDLE, FOAM BLOCK, W/MAGNET, W/BLADE GUARD, 10 COUNT, RED, LF

## (undated) DEVICE — SUTURE, VICRYL, 3-0, 27 IN, SH

## (undated) DEVICE — TUBE, BLOOD, VACUETTE, K2EDTA, LAVENDER W/BLK RING, 4ML, POLYETHYLENE, PULL CAP

## (undated) DEVICE — GLOVE, SURGICAL, PROTEXIS NEOPRENE, 7.5, PF, LF

## (undated) DEVICE — SUTURE, SILK, 3-0, 18 IN, MULTIPACK, BLACK

## (undated) DEVICE — SUTURE, MONOCRYLIC, 4-0, P3, MONO 18

## (undated) DEVICE — DRAPE, SHEET, MINOR PROCEDURE, T, PEDIATRIC, 100X122X77

## (undated) DEVICE — CLIP, LIGATING, W/ADHESIVE, WIDE SLOT, SMALL, TITANIUM

## (undated) DEVICE — HEMOSTAT, ARISTA, ABSORBABLE, 3 GRAMS

## (undated) DEVICE — SUTURE, PROLENE, 5-0, 36 IN, C-1, CV-11, BLUE

## (undated) DEVICE — ELECTRODE, ELECTROSURGICAL, BLADE, INSULATED, ENT/IMA, STERILE

## (undated) DEVICE — SUTURE, SILK, 2-0, 18 IN, BLACK

## (undated) DEVICE — ADHESIVE, SKIN, MASTISOL, 2/3 CC VIAL

## (undated) DEVICE — TOWEL, SURGICAL, NEURO, O/R, 16 X 26, BLUE, STERILE

## (undated) DEVICE — DISSECTOR, EXACT, LIGASURE

## (undated) DEVICE — COVER, CART, 45 X 27 X 48 IN, CLEAR

## (undated) DEVICE — SPONGE, DISSECTOR, PEANUT, 3/8, STERILE 5 FOAM HOLDER"

## (undated) DEVICE — SPONGE, GAUZE, XRAY DECT, 16 PLY, 4 X 4, W/MASTER DMT,STERILE

## (undated) DEVICE — NEEDLE, HYPODERMIC, NEEDLE PRO, 25G X 1.5, ORANGE

## (undated) DEVICE — APPLICATOR, PREP, CHLORAPREP, W/ORANGE TINT, 10.5ML

## (undated) DEVICE — DRAPE, INSTRUMENT, W/POUCH, STERI DRAPE, 7 X 11 IN, DISPOSABLE, STERILE

## (undated) DEVICE — CLIP, LIGATING, W/ADHESIVE PAD, MEDIUM, TITANIUM

## (undated) DEVICE — DRESSING, NON-ADHERENT, TELFA, OUCHLESS, 3 X 8 IN, STERILE